# Patient Record
Sex: FEMALE | Race: WHITE | NOT HISPANIC OR LATINO | Employment: FULL TIME | URBAN - METROPOLITAN AREA
[De-identification: names, ages, dates, MRNs, and addresses within clinical notes are randomized per-mention and may not be internally consistent; named-entity substitution may affect disease eponyms.]

---

## 2017-06-06 ENCOUNTER — OFFICE VISIT - HEALTHEAST (OUTPATIENT)
Dept: VASCULAR SURGERY | Facility: CLINIC | Age: 42
End: 2017-06-06

## 2017-06-06 DIAGNOSIS — I83.93 VARICOSE VEINS OF BOTH LOWER EXTREMITIES: ICD-10-CM

## 2017-06-06 RX ORDER — METHADONE HYDROCHLORIDE 10 MG/ML
CONCENTRATE ORAL
Status: SHIPPED | COMMUNITY
Start: 2017-06-06

## 2017-06-06 ASSESSMENT — MIFFLIN-ST. JEOR: SCORE: 1532.19

## 2021-05-28 ENCOUNTER — RECORDS - HEALTHEAST (OUTPATIENT)
Dept: ADMINISTRATIVE | Facility: CLINIC | Age: 46
End: 2021-05-28

## 2021-05-31 VITALS — WEIGHT: 200 LBS | BODY MASS INDEX: 34.15 KG/M2 | HEIGHT: 64 IN

## 2021-06-11 NOTE — PROGRESS NOTES
This is a new consult for Varicose Veins. The patient has varicose veins that are problematic in bilateral legs. Symptoms patient has been experiencing are heaviness, aching, cramps, discoloration and  swelling. Patient has been wearing compression stockings for about 3 months. No testing performed.

## 2021-06-11 NOTE — PROGRESS NOTES
Assessment:     1. spider veins, bilateral      Plan:     1. Treatment options of conservative therapy of stockings use, exercise, weight loss,  elevating legs when possible.    2. Script for compression stockings 20-30 mm hg  3. Ultrasound to evaluate legs for incompetency of both deep and superficial system .   4. Surgical treatment   Laser treatment of bilateral  spider veins   Risks and benefits of surgical intervention including infection, burns, dvt,  thrombophlebitis, not closing, recurrence, numbness and nerve injury and need  for further intervention were all discused    5. Follow up: prn.   6. Call for any questions concerns or issues    Subjective:      Zoraida Mix is a 42 y.o. female  who was referred by Chriss ESCOBAR MD  for evaluation of varicose veins. Symptoms include pain, aching, fatigue, burning, edema and dermatitis. Patient has history of leg selling, pain and vein issues that have progressed. Pain and symptoms have affected daily living and work activities needing medications. Here for evaluation today. Stocking use with compression stockings of 20-30 mm hg or greater for greater then 3 months    Allergies:Amoxicillin; Egg extract; Erythromycin; Sulfa (sulfonamide antibiotics); Tramadol; and Iodine    Past Medical History:   Diagnosis Date     Abnormal Pap smear of cervix      ADHD (attention deficit hyperactivity disorder)      Anxiety      Chronic back pain      Methadone maintenance therapy patient      Ovarian cyst      Rash        Past Surgical History:   Procedure Laterality Date     BARTHOLIN GLAND CYST EXCISION  2004       Current Outpatient Prescriptions   Medication Sig Note     methadone (DOLOPHINE) 10 mg/mL solution Take by mouth. 6/6/2017: [facility] Sig: Take  by mouth.     methylphenidate (RITALIN) 10 MG tablet Take 10 mg by mouth 2 (two) times a day.      nortriptyline (PAMELOR) 25 MG capsule Take 25 mg by mouth  "bedtime.        History reviewed. No pertinent family history.     reports that she has quit smoking. Her smoking use included Cigarettes. She quit after 18.00 years of use. She does not have any smokeless tobacco history on file. She reports that she does not drink alcohol or use illicit drugs.      Review of Systems  Pertinent items are noted in HPI.  A 12 point comprehensive review of systems was negative except as noted.      Objective:     Vitals:    06/06/17 0950   BP: 120/70   Patient Site: Right Arm   Patient Position: Sitting   Cuff Size: Adult Large   Pulse: 64   Resp: 16   Weight: 200 lb (90.7 kg)   Height: 5' 4\" (1.626 m)     Body mass index is 34.33 kg/(m^2).    EXAM:  GENERAL: This is a well-developed 42 y.o. female who appears her stated age  HEAD: normocephalic  HEENT: Pupils equal and reactive bilaterally  MOUTH: mucus membranes intact. Normal dentation  CARDIAC: RRR without murmur  CHEST/LUNG:  Clear to auscultation bilaterally  ABDOMEN: Soft, nontender, nondistended, no masses noted   NEUROLOGIC: Focally intact, nonfocal, alert and oriented x 3  INTEGUMENT: No open lesions or ulcers  VASCULAR: Pulses intact, symmetrical upper and lower extremities. There are notskin changes consistent with chronic venous insufficiency. Spider veins present bilateral.        Shine Couch MD  [facility] Dept.        "

## 2022-02-20 ENCOUNTER — HOSPITAL ENCOUNTER (EMERGENCY)
Facility: CLINIC | Age: 47
Discharge: HOME | End: 2022-02-20
Attending: EMERGENCY MEDICINE | Admitting: EMERGENCY MEDICINE
Payer: COMMERCIAL

## 2022-02-20 VITALS
RESPIRATION RATE: 20 BRPM | WEIGHT: 232 LBS | BODY MASS INDEX: 39.82 KG/M2 | TEMPERATURE: 98.1 F | OXYGEN SATURATION: 95 % | HEART RATE: 91 BPM | SYSTOLIC BLOOD PRESSURE: 180 MMHG | DIASTOLIC BLOOD PRESSURE: 108 MMHG

## 2022-02-20 DIAGNOSIS — M79.642 PAIN OF LEFT HAND: ICD-10-CM

## 2022-02-20 PROCEDURE — 99283 EMERGENCY DEPT VISIT LOW MDM: CPT

## 2022-02-20 PROCEDURE — 250N000013 HC RX MED GY IP 250 OP 250 PS 637: Performed by: EMERGENCY MEDICINE

## 2022-02-20 RX ORDER — IBUPROFEN 600 MG/1
600 TABLET, FILM COATED ORAL ONCE
Status: COMPLETED | OUTPATIENT
Start: 2022-02-20 | End: 2022-02-20

## 2022-02-20 RX ADMIN — IBUPROFEN 600 MG: 600 TABLET ORAL at 11:22

## 2022-02-20 NOTE — ED PROVIDER NOTES
EMERGENCY DEPARTMENT ENCOUNTER      NAME: Zoraida Mix  AGE: 46 year old female  YOB: 1975  MRN: [id number]  EVALUATION DATE & TIME: 2/20/2022 10:50 AM    PCP: No primary care provider on file.    ED PROVIDER: Loyda Brooks M.D.      Chief Complaint   Patient presents with     Hand Pain         FINAL IMPRESSION:  1. Pain of left hand          ED COURSE & MEDICAL DECISION MAKING:    ED Course as of 02/20/22 1134   Sun Feb 20, 2022   1114 Pt with some discomfort to lateral thenar eminence nondominant hand after recent typing/heavy lifting, no known trauma, eam without redness/warmth and afebrile without pain to axial loading of joints. Pt amenable to RICE therapy with hand f/u iif pain persists. Patient discharged after being provided with extensive anticipatory guidance and given return precautions, importance of PMD follow-up emphasized.        11:01 AM I met with the patient for the initial interview and physical examination. Discussed plan for treatment and workup in the ED.      Pertinent Labs & Imaging studies reviewed. (See chart for details)    PPE worn: surgical mask     At the conclusion of the encounter I discussed the results of all of the tests and the disposition. The questions were answered. The patient or family acknowledged understanding and was agreeable with the care plan.     MEDICATIONS GIVEN IN THE EMERGENCY:  Medications   ibuprofen (ADVIL/MOTRIN) tablet 600 mg (600 mg Oral Given 2/20/22 1122)       NEW PRESCRIPTIONS STARTED AT TODAY'S ER VISIT  New Prescriptions    No medications on file          =================================================================    HPI      Zoraida Mix is a 46 year old female with noncontributory PMHx who presents to the ED today via by walk in accompanied by her daughter with left hand pain.     Patient reports that she woke up yesterday morning with pain in her left hand, primarily near the base of her left thumb and extending toward her  "wrist. She states that her hand was sore, but she was able to use her hand including to carry grocery bags yesterday. Her pain progressively worsened last night and into today. She occasionally has shooting pains to the area. Pain is worsened with movement but feels improved with heat and when pressure is applied. She adds that she feels a \"pulling\" in her left forearm when she rotates it. No recent falls or trauma that the patient is able to recall. She took Tylenol extra-strength this morning which helped a little bit with her pain. No open wounds. No other symptoms or complaints at this time. Of note, patient is right-hand dominant.        REVIEW OF SYSTEMS   All other systems reviewed and are negative except as noted above in HPI.    PAST MEDICAL HISTORY:  History reviewed. No pertinent past medical history.    PAST SURGICAL HISTORY:  Past Surgical History:   Procedure Laterality Date     MARSUPIALIZATION BARTHOLIN CYST  2004       CURRENT MEDICATIONS:    methadone (DOLOPHINE) 10 mg/mL solution  methylphenidate (RITALIN) 10 MG tablet  nortriptyline (PAMELOR) 25 MG capsule        ALLERGIES:  Allergies   Allergen Reactions     Amoxicillin Unknown     Egg Extract [Albumin, Egg] Unknown     Says that has pain and diarrhea with it     Erythromycin Other (See Comments)     IV redness     Sulfa (Sulfonamide Antibiotics) [Sulfa Drugs] Unknown     Tramadol Nausea and Vomiting     Iodine Rash     \" Body turns yellow\" Sarika Zayas LPN 7/18/2011 9:39 AM        FAMILY HISTORY:  History reviewed. No pertinent family history.    SOCIAL HISTORY:   Social History     Socioeconomic History     Marital status:      Spouse name: Not on file     Number of children: Not on file     Years of education: Not on file     Highest education level: Not on file   Occupational History     Not on file   Tobacco Use     Smoking status: Former Smoker     Years: 18.00     Types: Cigarettes, Cigarettes     Smokeless tobacco: Not on file "   Substance and Sexual Activity     Alcohol use: No     Drug use: No     Sexual activity: Not on file   Other Topics Concern     Not on file   Social History Narrative     Not on file     Social Determinants of Health     Financial Resource Strain: Not on file   Food Insecurity: Not on file   Transportation Needs: Not on file   Physical Activity: Not on file   Stress: Not on file   Social Connections: Not on file   Intimate Partner Violence: Not on file   Housing Stability: Not on file       VITALS:  Patient Vitals for the past 24 hrs:   BP Temp Temp src Pulse Resp SpO2 Weight   02/20/22 1100 -- -- -- -- -- -- 105.2 kg (232 lb)   02/20/22 1056 (!) 180/108 98.1  F (36.7  C) Temporal 91 20 95 % --       PHYSICAL EXAM    GENERAL: Awake, alert.  In no acute distress.   HEENT: Normocephalic, atraumatic.  Pupils equal, round and reactive.  Conjunctiva normal.  EOMI.  NECK: No stridor or apparent deformity.  EXTREMITIES: No lower extremity swelling or edema. Left lateral thenar eminence very tender remote from anatomical snuffbox. No limitation of pronation, supination, or finger movement. Left thumb axial loading does not worsen symptoms.   NEURO: Alert and oriented to person, place and time.  Cranial nerves grossly intact.  No focal motor deficit.  PSYCH: Normal mood and affect  SKIN: No rashes            I, Esperanza Sanchez, am serving as a scribe to document services personally performed by Dr. Loyda Brooks based on my observation and the provider's statements to me. I, Loyda Brooks MD attest that Esperanza Sanchez is acting in a scribe capacity, has observed my performance of the services and has documented them in accordance with my direction.     Loyda Brooks MD  02/20/22 0189

## 2022-05-07 ENCOUNTER — HEALTH MAINTENANCE LETTER (OUTPATIENT)
Age: 47
End: 2022-05-07

## 2023-01-07 ENCOUNTER — HEALTH MAINTENANCE LETTER (OUTPATIENT)
Age: 48
End: 2023-01-07

## 2023-06-02 ENCOUNTER — HEALTH MAINTENANCE LETTER (OUTPATIENT)
Age: 48
End: 2023-06-02

## 2023-09-01 NOTE — ED TRIAGE NOTES
Pt here with left hand pain since yesterday, especially near the base of the thumb and wrist. No obvious trauma. Does typing for work. Did break her left ring finger a couple years ago, but does not have pain in that area. Pt did take two extra strength tylenol about 10 minutes before arrival to ED.    (2) cough or sneeze

## 2024-02-10 ENCOUNTER — HEALTH MAINTENANCE LETTER (OUTPATIENT)
Age: 49
End: 2024-02-10

## 2024-09-17 ENCOUNTER — APPOINTMENT (OUTPATIENT)
Dept: MRI IMAGING | Facility: HOSPITAL | Age: 49
End: 2024-09-17
Attending: EMERGENCY MEDICINE
Payer: COMMERCIAL

## 2024-09-17 ENCOUNTER — APPOINTMENT (OUTPATIENT)
Dept: CT IMAGING | Facility: HOSPITAL | Age: 49
End: 2024-09-17
Attending: STUDENT IN AN ORGANIZED HEALTH CARE EDUCATION/TRAINING PROGRAM
Payer: COMMERCIAL

## 2024-09-17 ENCOUNTER — HOSPITAL ENCOUNTER (EMERGENCY)
Facility: HOSPITAL | Age: 49
Discharge: HOME | End: 2024-09-17
Attending: EMERGENCY MEDICINE | Admitting: EMERGENCY MEDICINE
Payer: COMMERCIAL

## 2024-09-17 VITALS
RESPIRATION RATE: 18 BRPM | SYSTOLIC BLOOD PRESSURE: 112 MMHG | WEIGHT: 142 LBS | DIASTOLIC BLOOD PRESSURE: 75 MMHG | BODY MASS INDEX: 24.24 KG/M2 | OXYGEN SATURATION: 98 % | HEART RATE: 94 BPM | HEIGHT: 64 IN | TEMPERATURE: 98.4 F

## 2024-09-17 DIAGNOSIS — R20.2 TINGLING IN EXTREMITIES: ICD-10-CM

## 2024-09-17 DIAGNOSIS — H53.8 BLURRED VISION: ICD-10-CM

## 2024-09-17 DIAGNOSIS — R00.0 TACHYCARDIA, UNSPECIFIED: ICD-10-CM

## 2024-09-17 LAB
ANION GAP SERPL CALCULATED.3IONS-SCNC: 10 MMOL/L (ref 7–15)
APTT PPP: 28 SECONDS (ref 22–38)
BASOPHILS # BLD AUTO: 0.1 10E3/UL (ref 0–0.2)
BASOPHILS NFR BLD AUTO: 1 %
BUN SERPL-MCNC: 9 MG/DL (ref 6–20)
CALCIUM SERPL-MCNC: 8.7 MG/DL (ref 8.8–10.4)
CHLORIDE SERPL-SCNC: 104 MMOL/L (ref 98–107)
CREAT SERPL-MCNC: 0.75 MG/DL (ref 0.51–0.95)
EGFRCR SERPLBLD CKD-EPI 2021: >90 ML/MIN/1.73M2
EOSINOPHIL # BLD AUTO: 0.1 10E3/UL (ref 0–0.7)
EOSINOPHIL NFR BLD AUTO: 1 %
ERYTHROCYTE [DISTWIDTH] IN BLOOD BY AUTOMATED COUNT: 13.5 % (ref 10–15)
GLUCOSE BLDC GLUCOMTR-MCNC: 115 MG/DL (ref 70–99)
GLUCOSE SERPL-MCNC: 108 MG/DL (ref 70–99)
HCG SERPL QL: NEGATIVE
HCO3 SERPL-SCNC: 24 MMOL/L (ref 22–29)
HCT VFR BLD AUTO: 38.3 % (ref 35–47)
HGB BLD-MCNC: 12.7 G/DL (ref 11.7–15.7)
IMM GRANULOCYTES # BLD: 0 10E3/UL
IMM GRANULOCYTES NFR BLD: 0 %
INR PPP: 1.07 (ref 0.85–1.15)
LYMPHOCYTES # BLD AUTO: 2.5 10E3/UL (ref 0.8–5.3)
LYMPHOCYTES NFR BLD AUTO: 30 %
MAGNESIUM SERPL-MCNC: 2.1 MG/DL (ref 1.7–2.3)
MCH RBC QN AUTO: 29 PG (ref 26.5–33)
MCHC RBC AUTO-ENTMCNC: 33.2 G/DL (ref 31.5–36.5)
MCV RBC AUTO: 87 FL (ref 78–100)
MONOCYTES # BLD AUTO: 0.5 10E3/UL (ref 0–1.3)
MONOCYTES NFR BLD AUTO: 6 %
NEUTROPHILS # BLD AUTO: 5.1 10E3/UL (ref 1.6–8.3)
NEUTROPHILS NFR BLD AUTO: 62 %
NRBC # BLD AUTO: 0 10E3/UL
NRBC BLD AUTO-RTO: 0 /100
PLATELET # BLD AUTO: 273 10E3/UL (ref 150–450)
POTASSIUM SERPL-SCNC: 3.4 MMOL/L (ref 3.4–5.3)
RBC # BLD AUTO: 4.38 10E6/UL (ref 3.8–5.2)
SODIUM SERPL-SCNC: 138 MMOL/L (ref 135–145)
TROPONIN T SERPL HS-MCNC: 6 NG/L
TSH SERPL DL<=0.005 MIU/L-ACNC: 0.89 UIU/ML (ref 0.3–4.2)
WBC # BLD AUTO: 8.2 10E3/UL (ref 4–11)

## 2024-09-17 PROCEDURE — 83735 ASSAY OF MAGNESIUM: CPT | Performed by: EMERGENCY MEDICINE

## 2024-09-17 PROCEDURE — 250N000011 HC RX IP 250 OP 636: Performed by: STUDENT IN AN ORGANIZED HEALTH CARE EDUCATION/TRAINING PROGRAM

## 2024-09-17 PROCEDURE — 84484 ASSAY OF TROPONIN QUANT: CPT | Performed by: STUDENT IN AN ORGANIZED HEALTH CARE EDUCATION/TRAINING PROGRAM

## 2024-09-17 PROCEDURE — 82374 ASSAY BLOOD CARBON DIOXIDE: CPT | Performed by: STUDENT IN AN ORGANIZED HEALTH CARE EDUCATION/TRAINING PROGRAM

## 2024-09-17 PROCEDURE — 82565 ASSAY OF CREATININE: CPT | Performed by: STUDENT IN AN ORGANIZED HEALTH CARE EDUCATION/TRAINING PROGRAM

## 2024-09-17 PROCEDURE — A9585 GADOBUTROL INJECTION: HCPCS | Performed by: EMERGENCY MEDICINE

## 2024-09-17 PROCEDURE — 99207 PR NO CHARGE LOS: CPT

## 2024-09-17 PROCEDURE — 36415 COLL VENOUS BLD VENIPUNCTURE: CPT | Performed by: STUDENT IN AN ORGANIZED HEALTH CARE EDUCATION/TRAINING PROGRAM

## 2024-09-17 PROCEDURE — 250N000013 HC RX MED GY IP 250 OP 250 PS 637: Performed by: EMERGENCY MEDICINE

## 2024-09-17 PROCEDURE — 99285 EMERGENCY DEPT VISIT HI MDM: CPT | Mod: 25

## 2024-09-17 PROCEDURE — 85730 THROMBOPLASTIN TIME PARTIAL: CPT | Performed by: STUDENT IN AN ORGANIZED HEALTH CARE EDUCATION/TRAINING PROGRAM

## 2024-09-17 PROCEDURE — 70553 MRI BRAIN STEM W/O & W/DYE: CPT

## 2024-09-17 PROCEDURE — 85049 AUTOMATED PLATELET COUNT: CPT | Performed by: STUDENT IN AN ORGANIZED HEALTH CARE EDUCATION/TRAINING PROGRAM

## 2024-09-17 PROCEDURE — 250N000011 HC RX IP 250 OP 636: Performed by: EMERGENCY MEDICINE

## 2024-09-17 PROCEDURE — 96374 THER/PROPH/DIAG INJ IV PUSH: CPT | Mod: 59

## 2024-09-17 PROCEDURE — 255N000002 HC RX 255 OP 636: Performed by: EMERGENCY MEDICINE

## 2024-09-17 PROCEDURE — 93005 ELECTROCARDIOGRAM TRACING: CPT | Performed by: STUDENT IN AN ORGANIZED HEALTH CARE EDUCATION/TRAINING PROGRAM

## 2024-09-17 PROCEDURE — 85610 PROTHROMBIN TIME: CPT | Performed by: STUDENT IN AN ORGANIZED HEALTH CARE EDUCATION/TRAINING PROGRAM

## 2024-09-17 PROCEDURE — 82962 GLUCOSE BLOOD TEST: CPT

## 2024-09-17 PROCEDURE — 84443 ASSAY THYROID STIM HORMONE: CPT | Performed by: EMERGENCY MEDICINE

## 2024-09-17 PROCEDURE — 70496 CT ANGIOGRAPHY HEAD: CPT

## 2024-09-17 PROCEDURE — 84703 CHORIONIC GONADOTROPIN ASSAY: CPT | Performed by: STUDENT IN AN ORGANIZED HEALTH CARE EDUCATION/TRAINING PROGRAM

## 2024-09-17 RX ORDER — POTASSIUM CHLORIDE 1500 MG/1
20 TABLET, EXTENDED RELEASE ORAL ONCE
Status: COMPLETED | OUTPATIENT
Start: 2024-09-17 | End: 2024-09-17

## 2024-09-17 RX ORDER — GADOBUTROL 604.72 MG/ML
6.5 INJECTION INTRAVENOUS ONCE
Status: COMPLETED | OUTPATIENT
Start: 2024-09-17 | End: 2024-09-17

## 2024-09-17 RX ORDER — LORAZEPAM 2 MG/ML
0.5 INJECTION INTRAMUSCULAR ONCE
Status: COMPLETED | OUTPATIENT
Start: 2024-09-17 | End: 2024-09-17

## 2024-09-17 RX ORDER — IOPAMIDOL 755 MG/ML
67 INJECTION, SOLUTION INTRAVASCULAR ONCE
Status: COMPLETED | OUTPATIENT
Start: 2024-09-17 | End: 2024-09-17

## 2024-09-17 RX ADMIN — IOPAMIDOL 67 ML: 755 INJECTION, SOLUTION INTRAVENOUS at 11:44

## 2024-09-17 RX ADMIN — GADOBUTROL 6.5 ML: 604.72 INJECTION INTRAVENOUS at 14:33

## 2024-09-17 RX ADMIN — LORAZEPAM 0.5 MG: 2 INJECTION INTRAMUSCULAR; INTRAVENOUS at 13:47

## 2024-09-17 RX ADMIN — POTASSIUM CHLORIDE 20 MEQ: 1500 TABLET, EXTENDED RELEASE ORAL at 15:48

## 2024-09-17 ASSESSMENT — ACTIVITIES OF DAILY LIVING (ADL)
ADLS_ACUITY_SCORE: 35

## 2024-09-17 ASSESSMENT — ENCOUNTER SYMPTOMS
ABDOMINAL PAIN: 0
VOMITING: 0
COUGH: 0
HEADACHES: 1
WEAKNESS: 1
SPEECH DIFFICULTY: 1
NUMBNESS: 1
SHORTNESS OF BREATH: 0
FEVER: 0
DIARRHEA: 0
NAUSEA: 0
DYSURIA: 0

## 2024-09-17 ASSESSMENT — COLUMBIA-SUICIDE SEVERITY RATING SCALE - C-SSRS
2. HAVE YOU ACTUALLY HAD ANY THOUGHTS OF KILLING YOURSELF IN THE PAST MONTH?: NO
6. HAVE YOU EVER DONE ANYTHING, STARTED TO DO ANYTHING, OR PREPARED TO DO ANYTHING TO END YOUR LIFE?: NO
1. IN THE PAST MONTH, HAVE YOU WISHED YOU WERE DEAD OR WISHED YOU COULD GO TO SLEEP AND NOT WAKE UP?: NO

## 2024-09-17 NOTE — ED PROVIDER NOTES
EMERGENCY DEPARTMENT ENCOUNTER      NAME: Zoraida Mix  AGE: 49 year old female  YOB: 1975  MRN: [id number]  EVALUATION DATE & TIME: No admission date for patient encounter.    PCP: Eleanor Horowitz    ED PROVIDER: Nadja Jerez M.D.        Chief Complaint   Patient presents with    Loss of Vision         FINAL IMPRESSION:    1. Tachycardia, unspecified    2. Tingling in extremities    3. Blurred vision            MEDICAL DECISION MAKING:    Zoraida Mix is a 49 year old female with history of hypertension and anxiety who presents to the ER with complaints of vision changes, speech difficulty, feeling unsteady, tingling in her arms and legs.    This began acutely while working with the patient in the clinic.  She states she has never had a panic attack though t sound like it could be a panic attack though she denies anything that would have triggered it.  Neuroexams had resolved before she presented to the ER.  EKG, laboratories, imaging, electrolytes, etc. are all unremarkable.  Plan at this time is discharge home to follow-up with cardiology as an outpatient for second opinion.  No cardiac dysrhythmias seen while here in the ER.  She already has an appointment with primary care for this month and she will keep that appointment as well.    She understands what to watch for and when to return to the ER and all of her questions have been answered.        ED COURSE:  11:18 AM  Received a call from the patient's doctor saying that he was sending her in.  I saw her name upon arrival here to the ER and saw her immediately in triage.  I met with the patient in triage to gather history and perform my exam. ED course and treatment discussed.  Given the recent onset of symptoms and the objective findings seen by the doctor will go ahead and run her as a stroke code.  At this time though she does appear to be back to normal.    11:23 AM  Spoke with Stroke Neuro and we will proceed with CTA head and  neck.     11:42 AM  Spoke with Radiologist who reports CT head is negative. CTA is coming now.     11:56 AM  Spoke with Radiology about CTA head and it looks ok. Nothing acute.    11:58 AM  Spoke with Stroke neuro. Deescalate stroke code and do MRI.     12:20 PM  Updated patient on current results.  She had just ambulated back from the bathroom without any issue.  She agrees with the plan to proceed with MRI imaging.  Family present at bedside.    1:38 PM  Pt is awaiting to go to MRI.    3:46 PM  Imaging looks good.  I have added on magnesium and thyroid testing.  Anticipate discharge home.  Will have her follow-up with cardiology rapid access clinic given his tachycardia, but there is also possibility this could have been a panic attack.  All of her symptoms have otherwise resolved.  No cardiac dysrhythmias appreciated here in the ER.    4:47 PM  The patient is aware of all of their results and agrees with the plan for discharge.  They understand what to watch for, when to return to the ER, and all of their questions have been answered.    Patient's neuroexam is unremarkable.  She states she has never had a panic attack in the past though this is certainly still in the differential as the cause of her symptoms.  No cardiac dysrhythmias witnessed here in the ER.  To me does not sound like PE, ACS, etc.  Do not think she requires any imaging of her chest.  Nothing to suggest aortic dissection.  Plan at this time is have her follow-up with cardiology.  She already has an appointment with primary care for this month for regular routine physical and she will discuss all of this with them as well.    I do not think that this represents rib fractures, myocarditis, pericarditis, endocarditis, ACS, PE, ruptured AAA, pneumothorax, aortic dissection, bowel obstruction, bowel ischemia, cholecystitis, kidney stone, pyelonephritis,  CVA, TIA, SAH, glaucoma, temporal arteritis, sinus thrombosis, vascular dissection, pseudotumor  cerebri, meningitis, enchephalitis, hypertensive urgency or emergency, or other such etiologies.      At the conclusion of the encounter I discussed the results of all of the tests and the disposition. Their questions were answered. The patient (and any family present) acknowledged understanding and were agreeable with the care plan.      CONSULTANTS:  Stroke Neuro - Kayce Gutierrez PA-C  Radiologist - Dr. Mobley        MEDICATIONS GIVEN IN THE EMERGENCY:  Medications   iopamidol (ISOVUE-370) solution 67 mL (67 mLs Intravenous $Given 9/17/24 1144)     And   sodium chloride 0.9 % bag for CT scan flush use (0 mLs As instructed Hold 9/17/24 1544)   LORazepam (ATIVAN) injection 0.5 mg (0.5 mg Intravenous $Given 9/17/24 1347)   gadobutrol (GADAVIST) injection 6.5 mL (6.5 mLs Intravenous $Given 9/17/24 1433)   potassium chloride teodora ER (KLOR-CON M20) CR tablet 20 mEq (20 mEq Oral $Given 9/17/24 1548)           NEW PRESCRIPTIONS STARTED AT TODAY'S ER VISIT     Medication List      There are no discharge medications for this visit.             CONDITION:  Stable, improved        DISPOSITION:  D.c home with family         =================================================================  =================================================================  TRIAGE ASSESSMENT:  Patient at work at 10:00 developed blurred vision, numbness bilateral numbness, tingling, heart racing lasting 10 mins. Unable to type. Grasps equal, no drift, strength extremities strong equal      ED Triage Vitals [09/17/24 1115]   Enc Vitals Group      BP (!) 171/95      Pulse 112      Resp 20      Temp 98.4  F (36.9  C)      Temp src       SpO2 97 %          ================================================================  ================================================================    [facility] [place]    Patient information was obtained from: patient and MD at [facility]    Use of Intrepreter: N/A      Zoraida ZHANG Dominguez is a 49 year old female with history of  hypertension and anxiety who presents to the ER with complaints of vision changes, speech difficulty, feeling unsteady, tingling in her arms and legs.    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  She was in with the patient around 10 AM this morning and shortly afterwards started to experience heart racing, vision changes, speech difficulty and numbness in arms and legs.  She states the patient even looked at her oddly because of the way she was speaking.    She states all her symptoms lasted about 10 minutes.    EKG was done in clinic and the position tells me that it was sinus rhythm with a rate of 105 bpm though they did get a heart rate of about 140 prior to this EKG.    The provider at the clinic did feel like her speech was abnormal.    Patient denies being on any blood thinning medications.  She states she does take blood pressure medications and has been compliant.    She does report a little bit of a headache but states this is her usual headache without changes.  Otherwise denies fevers, cough, chest pain, shortness of breath, abdominal pain, vomiting or diarrhea.    She feels like all of her symptoms have resolved though still feels a bit anxious and tearful.      CHART REVIEW:  Chart review shows that patient was seen back in May 17, 2024 in primary care office for a rash under her breasts and on her stomach as well as hot flashes associated with menopause.      REVIEW OF SYSTEMS  Review of Systems   Constitutional:  Negative for fever.   Eyes:  Positive for visual disturbance.   Respiratory:  Negative for cough and shortness of breath.    Cardiovascular:  Negative for chest pain.   Gastrointestinal:  Negative for abdominal pain, diarrhea, nausea and vomiting.   Genitourinary:  Negative for dysuria.   Neurological:  Positive for speech difficulty, weakness (bilateral arms and legs), numbness (bilateral arms and legs) and headaches.   All other systems reviewed and are negative.        PAST MEDICAL  "HISTORY:  History reviewed. No pertinent past medical history.      PAST SURGICAL HISTORY:  Past Surgical History:   Procedure Laterality Date    MARSUPIALIZATION BARTHOLIN CYST  2004         CURRENT MEDICATIONS:    Prior to Admission medications    Medication Sig Start Date End Date Taking? Authorizing Provider   methadone (DOLOPHINE) 10 mg/mL solution [METHADONE (DOLOPHINE) 10 MG/ML SOLUTION] Take by mouth. 6/6/17   Provider, Historical   methylphenidate (RITALIN) 10 MG tablet [METHYLPHENIDATE (RITALIN) 10 MG TABLET] Take 10 mg by mouth 2 (two) times a day. 9/27/15   Provider, Historical   nortriptyline (PAMELOR) 25 MG capsule [NORTRIPTYLINE (PAMELOR) 25 MG CAPSULE] Take 25 mg by mouth bedtime. 9/27/15   Provider, Historical         ALLERGIES:  Allergies   Allergen Reactions    Amoxicillin Unknown    Egg Extract [Egg White (Egg Protein)] Unknown     Says that has pain and diarrhea with it    Erythromycin Other (See Comments)     IV redness    Sulfa (Sulfonamide Antibiotics) [Sulfa Antibiotics] Unknown    Tramadol Nausea and Vomiting    Iodine Rash     Patient had CTA head/neck in ER 9/17/24 without pretreatment meds or any issues. No signs of allergic reaction. ====  KASHIF Jerez. MD 9/17/24  \" Body turns yellow\" Sarika Zayas LPN 7/18/2011 9:39 AM          FAMILY HISTORY:  History reviewed. No pertinent family history.      SOCIAL HISTORY:  Social History     Socioeconomic History    Marital status:    Tobacco Use    Smoking status: Former     Types: Cigarettes   Substance and Sexual Activity    Alcohol use: No    Drug use: No     Social Determinants of Health     Financial Resource Strain: Low Risk  (5/17/2024)    Received from [employer]    Financial Resource Strain     Difficulty of Paying Living Expenses: 3   Food Insecurity: No Food Insecurity (5/17/2024)    Received from [employer]    Food Insecurity     Worried About Running Out of " "Food in the Last Year: 1   Transportation Needs: No Transportation Needs (5/17/2024)    Received from [facility] [employer] [facility]    Transportation Needs     Lack of Transportation (Medical): 1   Social Connections: Socially Integrated (5/17/2024)    Received from [facility] [employer] [facility] [employer] [facility]    Social Connections     Frequency of Communication with Friends and Family: 0   Housing Stability: Low Risk  (5/17/2024)    Received from [facility] [facility]    Housing Stability     Unable to Pay for Housing in the Last Year: 1         VITALS:  Patient Vitals for the past 24 hrs:   BP Temp Pulse Resp SpO2 Height Weight   09/17/24 1545 112/75 -- 94 -- 98 % -- --   09/17/24 1445 139/83 -- -- -- -- -- --   09/17/24 1345 119/66 -- 84 -- 97 % -- --   09/17/24 1330 120/67 -- 87 -- 98 % -- --   09/17/24 1315 127/69 -- 85 -- 96 % -- --   09/17/24 1300 124/67 -- 97 -- 97 % -- --   09/17/24 1245 124/69 -- 93 -- 96 % -- --   09/17/24 1230 134/81 -- 93 -- 97 % -- --   09/17/24 1207 -- -- 104 18 90 % -- --   09/17/24 1200 137/82 -- 100 21 98 % -- --   09/17/24 1147 -- -- 102 18 97 % -- --   09/17/24 1145 (!) 141/85 -- 105 19 97 % -- --   09/17/24 1118 -- -- -- -- -- 1.626 m (5' 4\") 64.4 kg (142 lb)   09/17/24 1115 (!) 171/95 98.4  F (36.9  C) 112 20 97 % -- --       Wt Readings from Last 3 Encounters:   09/17/24 64.4 kg (142 lb)   02/20/22 105.2 kg (232 lb)   06/06/17 90.7 kg (200 lb)       Estimated Creatinine Clearance: 92.2 mL/min (based on SCr of 0.75 mg/dL).    PHYSICAL EXAM    Constitutional:  Well developed, Well nourished, NAD  HENT:  Normocephalic, Atraumatic, Bilateral external ears normal, Nose normal. Neck- Supple, No stridor.   Eyes:  PERRL, EOMI, Conjunctiva normal, No discharge.  Respiratory:  Normal breath sounds, No respiratory distress, No wheezing, Speaks full sentences easily. No cough.   Cardiovascular:  Normal heart rate, Regular rhythm, No murmurs , No rubs, " No gallops. Chest wall nontender.   GI:  No excessive obesity.  Bowel sounds normal, Soft, No tenderness, No masses, No flank tenderness. No rebound or guarding.   : deferred  Musculoskeletal:  No cyanosis, No clubbing. Good range of motion in all major joints. No major deformities noted.   Integument:  Warm, Dry, No erythema, No rash.  No petechiae.   Neurologic:  Alert & oriented x 3, Normal motor function, Normal sensory function, No focal deficits noted.   Psychiatric: Tearful, Cooperative     National Institutes of Health Stroke Scale  Exam Interval: Baseline   Score    Level of consciousness: (0)   Alert, keenly responsive    LOC questions: (0)   Answers both questions correctly    LOC commands: (0)   Performs both tasks correctly    Best gaze: (0)   Normal    Visual: (0)   No visual loss    Facial palsy: (0)   Normal symmetrical movements    Motor arm (left): (0)   No drift    Motor arm (right): (0)   No drift    Motor leg (left): (0)   No drift    Motor leg (right): (0)   No drift    Limb ataxia: (0)   Absent    Sensory: (0)   Normal- no sensory loss    Best language: (0)   Normal- no aphasia    Dysarthria: (0)   Normal    Extinction and inattention: (0)   No abnormality        Total Score:  0   '    LAB:  All pertinent labs reviewed and interpreted.  Recent Results (from the past 24 hour(s))   Glucose by meter    Collection Time: 09/17/24 11:22 AM   Result Value Ref Range    GLUCOSE BY METER POCT 115 (H) 70 - 99 mg/dL   Basic metabolic panel    Collection Time: 09/17/24 11:43 AM   Result Value Ref Range    Sodium 138 135 - 145 mmol/L    Potassium 3.4 3.4 - 5.3 mmol/L    Chloride 104 98 - 107 mmol/L    Carbon Dioxide (CO2) 24 22 - 29 mmol/L    Anion Gap 10 7 - 15 mmol/L    Urea Nitrogen 9.0 6.0 - 20.0 mg/dL    Creatinine 0.75 0.51 - 0.95 mg/dL    GFR Estimate >90 >60 mL/min/1.73m2    Calcium 8.7 (L) 8.8 - 10.4 mg/dL    Glucose 108 (H) 70 - 99 mg/dL   INR    Collection Time: 09/17/24 11:43 AM   Result Value  "Ref Range    INR 1.07 0.85 - 1.15   Partial thromboplastin time    Collection Time: 09/17/24 11:43 AM   Result Value Ref Range    aPTT 28 22 - 38 Seconds   Troponin T, High Sensitivity    Collection Time: 09/17/24 11:43 AM   Result Value Ref Range    Troponin T, High Sensitivity 6 <=14 ng/L   hCG Qualitative Pregnancy    Collection Time: 09/17/24 11:43 AM   Result Value Ref Range    hCG Serum Qualitative Negative Negative   CBC with platelets and differential    Collection Time: 09/17/24 11:43 AM   Result Value Ref Range    WBC Count 8.2 4.0 - 11.0 10e3/uL    RBC Count 4.38 3.80 - 5.20 10e6/uL    Hemoglobin 12.7 11.7 - 15.7 g/dL    Hematocrit 38.3 35.0 - 47.0 %    MCV 87 78 - 100 fL    MCH 29.0 26.5 - 33.0 pg    MCHC 33.2 31.5 - 36.5 g/dL    RDW 13.5 10.0 - 15.0 %    Platelet Count 273 150 - 450 10e3/uL    % Neutrophils 62 %    % Lymphocytes 30 %    % Monocytes 6 %    % Eosinophils 1 %    % Basophils 1 %    % Immature Granulocytes 0 %    NRBCs per 100 WBC 0 <1 /100    Absolute Neutrophils 5.1 1.6 - 8.3 10e3/uL    Absolute Lymphocytes 2.5 0.8 - 5.3 10e3/uL    Absolute Monocytes 0.5 0.0 - 1.3 10e3/uL    Absolute Eosinophils 0.1 0.0 - 0.7 10e3/uL    Absolute Basophils 0.1 0.0 - 0.2 10e3/uL    Absolute Immature Granulocytes 0.0 <=0.4 10e3/uL    Absolute NRBCs 0.0 10e3/uL   Magnesium    Collection Time: 09/17/24 11:43 AM   Result Value Ref Range    Magnesium 2.1 1.7 - 2.3 mg/dL   TSH with free T4 reflex    Collection Time: 09/17/24 11:43 AM   Result Value Ref Range    TSH 0.89 0.30 - 4.20 uIU/mL       No results found for: \"ABORH\"        RADIOLOGY:  Reviewed all pertinent imaging. Please see official radiology report.    MR Brain w/o & w Contrast   Final Result   Impression:       No acute intracranial pathology. No specific imaging findings to explain the patient's symptoms.            CTA Head Neck with Contrast   Final Result   IMPRESSION:    HEAD CT:   1.  No evidence of intracranial hemorrhage, large territorial " infarct, or other acute intracranial abnormality.      HEAD CTA:    1.  No high-grade stenosis or occlusion of the major arteries in the head.   2.  Dominant left vertebral artery and small right vertebral artery, normal variant.      NECK CTA:   1.  No high-grade stenosis or occlusion of the major arteries in the neck.         Stroke communication: Noncontrast head CT results were communicated with Dr. Jerez by Dr. Mobley on 09/17/2024 at 11:43 AM. Head and neck CTA results were communicated with Dr. Jerez by Dr. Mobley on 09/17/2024 at 11:56 AM.            EKG:    Indication: stroke sx    Performed at: 11:44am  Impression: Sinus rhythm at 96 bpm.  Flipped T waves in 3, aVR.  No ST elevation appreciated.   ms, QRS 98 ms, QTc 444 ms.  No prior EKGs to compare to.      I have independently reviewed and interpreted the EKG(s) documented above.        PROCEDURES:  none    Medical Decision Making  Obtained supplemental history:Supplemental history obtained?: Documented in chart and Other: MD at her work primary care clinic who saw her and sent her to the ER  Reviewed external records: External records reviewed?: Documented in chart and Outpatient Record: see hPI  Care impacted by chronic illness:Hypertension and Mental Health  Care significantly affected by social determinants of health:N/A  Did you consider but not order tests?: Work up considered but not performed and documented in chart, if applicable  Did you interpret images independently?: Independent interpretation of ECG and images noted in documentation, when applicable.  Consultation discussion with other provider:Did you involve another provider (consultant, MH, pharmacy, etc.)?: I discussed the care with another health care provider, see documentation for details.  Discharge. No recommendations on prescription strength medication(s). I considered admission, but ultimately discharged patient with reassuring CT scan, MRI, laboratories, and  cardiac monitoring here in the ER.  Not Applicable    Nadja Jerez M.D. [facility]  Emergency Medicine and Medical Toxicology  [facility] EMERGENCY DEPARTMENT  [facility] [facility] [id number] [phone]  Dept: [phone]           Nadja Jerez MD  09/17/24 5784

## 2024-09-17 NOTE — CONSULTS
[facility]    Stroke Telephone Note    I was called by Dr. Nadja Jerez on 09/17/24 regarding patient Zoraida Mix. The patient is a 49 year old female with a PMH significant for HTN, Anxiety, hx of tobacco use, ADHD, chronic low back pain. History obtained from ED provider. 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 Her symptoms lasted an estimated 10 minutes. Her heart rate was calculated at the time of symptoms to be 140 bpm. EKG done at her clinic revealed normal sinus. Now in the ED her symptoms continue to be resolved and is back to baseline but has a headache, which she reports is typically baseline for her. On ED providers exam, Zoraida has an NIH of 0. Presenting BP is 171/95.     Vitals  BP: (!) 171/95   Pulse: 112   Resp: 20   Temp: 98.4  F (36.9  C)   Weight: 64.4 kg (142 lb)    Stroke Code Data (for stroke code without tele)  Stroke code activated 09/17/24  1119   Stroke provider first response 09/17/24  1122   Last known normal 09/17/24  1000      Time of discovery (or onset of symptoms) 09/17/24  1000   Head CT read by Stroke Neuro Provider 09/17/24  1131   Was stroke code de-escalated? Yes  09/17/24        Imaging Findings  CT head: No evidence of hemorrhage.   CTA head/neck: No large vessel occlusion.     Intravenous Thrombolysis  Not given due to:   - minor/isolated/quickly resolving symptoms    Endovascular Treatment  Not initiated due to absence of proximal vessel occlusion    Impression  Episode of palpitations, vision blurriness/loss, bilateral upper and lower extremity numbness, difficulty word finding, now resolved, unclear etiology.  Lower suspicion for neuro vascular etiology given presenting symptoms and non-focal examination.     Recommendations   - Recommend  "symptomatic treatment of her headache  - Recommend toxic/metabolic/infectious etiology evaluation   - brain MRI with and without contrast; please page stroke neurology if infarct is identified for further recommendations    UPDATE: MRI brain reviewed and negative for acute stroke.     No further stroke workup is recommended, we will sign off. Please contact us for additional questions or concerns.    Case discussed with vascular neurology attending Dr. Lacy.    My recommendations are based on the information provided over the phone by Zoraida Mix's in-person providers. They are not intended to replace the clinical judgment of her in-person providers. I was not requested to personally see or examine the patient at this time.     Kayce Gutierrez PA-C  Vascular Neurology    To page me or covering stroke neurology team member, click here: AMCOM  Choose \"On Call\" tab at top, then select \"NEUROLOGY/ALL SITES\" from middle drop-down box, press Enter, then look for \"stroke\" or \"telestroke\" for your site.       50 yo female with a PMH significant for HTN, Anxiety, tobacco use   "

## 2024-09-17 NOTE — ED TRIAGE NOTES
Patient at work at 10:00 developed blurred vision, numbness bilateral numbness, tingling, heart racing lasting 10 mins. Unable to type. Grasps equal, no drift, strength extremities strong equal

## 2024-09-17 NOTE — ED NOTES
Expected Patient Referral to ED  11:14 AM    Referring Clinic/Provider:  Dr. Aftab Villasenor    Reason for referral/Clinical facts:then slurred speech developed, unstable feeling  New patient to them  Anxiety history    /106  Repeat /95  EKG NSR ()    Recommendations provided:  Send to ED for further evaluation    Caller was informed that this institution does possess the capabilities and/or resources to provide for patient and should be transferred to our facility.    Discussed that if direct admit is sought and any hurdles are encountered, this ED would be happy to see the patient and evaluate.    Informed caller that recommendations provided are recommendations based only on the facts provided and that they responsible to accept or reject the advice, or to seek a formal in person consultation as needed and that this ED will see/treat patient should they arrive.      Nadja Jerez MD  [facility] EMERGENCY DEPARTMENT  [address] [place] [id number]  [phone]       Nadja Jerez MD  09/17/24 1116

## 2024-09-17 NOTE — DISCHARGE INSTRUCTIONS
All of your tests look good today.  Nothing to suggest stroke or other concerning causes.    Since you did have an episode of rapid heartbeat I do think it would be beneficial to follow-up with cardiology.  Call and make an appointment to follow-up in the cardiology rapid access clinic.  A referral will be sent electronically to them.    Keep your appointment to see your primary care upcoming.    It is possible that your symptoms could have also been related to a panic attack.  Discuss this with primary care when you see them.    Return to emergency department if you have return of your symptoms, chest pain, difficulty breathing, recurrent fast heartbeat, dizziness, any new numbness or weakness, or any other concerns.    Thank you for choosing [facility] Emergency Department.  It has been my pleasure caring for you today.     ~Dr. Solitario MD

## 2024-09-18 LAB
ATRIAL RATE - MUSE: 96 BPM
DIASTOLIC BLOOD PRESSURE - MUSE: NORMAL MMHG
INTERPRETATION ECG - MUSE: NORMAL
P AXIS - MUSE: 55 DEGREES
PR INTERVAL - MUSE: 164 MS
QRS DURATION - MUSE: 98 MS
QT - MUSE: 352 MS
QTC - MUSE: 444 MS
R AXIS - MUSE: 65 DEGREES
SYSTOLIC BLOOD PRESSURE - MUSE: NORMAL MMHG
T AXIS - MUSE: 27 DEGREES
VENTRICULAR RATE- MUSE: 96 BPM

## 2024-11-10 ENCOUNTER — HEALTH MAINTENANCE LETTER (OUTPATIENT)
Age: 49
End: 2024-11-10

## 2025-05-10 ENCOUNTER — HEALTH MAINTENANCE LETTER (OUTPATIENT)
Age: 50
End: 2025-05-10